# Patient Record
Sex: FEMALE | Race: WHITE | Employment: OTHER | URBAN - METROPOLITAN AREA
[De-identification: names, ages, dates, MRNs, and addresses within clinical notes are randomized per-mention and may not be internally consistent; named-entity substitution may affect disease eponyms.]

---

## 2017-05-04 ENCOUNTER — HOSPITAL ENCOUNTER (EMERGENCY)
Age: 37
Discharge: HOME | End: 2017-05-04
Attending: EMERGENCY MEDICINE
Payer: OTHER GOVERNMENT

## 2017-05-04 VITALS
SYSTOLIC BLOOD PRESSURE: 119 MMHG | BODY MASS INDEX: 24.71 KG/M2 | WEIGHT: 163 LBS | OXYGEN SATURATION: 98 % | RESPIRATION RATE: 18 BRPM | TEMPERATURE: 98.1 F | HEART RATE: 63 BPM | DIASTOLIC BLOOD PRESSURE: 66 MMHG | HEIGHT: 68 IN

## 2017-05-04 DIAGNOSIS — T78.40XA ALLERGIC REACTION, INITIAL ENCOUNTER: Primary | ICD-10-CM

## 2017-05-04 PROCEDURE — 74011250637 HC RX REV CODE- 250/637

## 2017-05-04 PROCEDURE — 74011636637 HC RX REV CODE- 636/637: Performed by: EMERGENCY MEDICINE

## 2017-05-04 PROCEDURE — 99283 EMERGENCY DEPT VISIT LOW MDM: CPT

## 2017-05-04 PROCEDURE — 74011250637 HC RX REV CODE- 250/637: Performed by: EMERGENCY MEDICINE

## 2017-05-04 RX ORDER — PREDNISONE 20 MG/1
40 TABLET ORAL DAILY
Qty: 10 TAB | Refills: 0 | Status: SHIPPED | OUTPATIENT
Start: 2017-05-04 | End: 2017-05-09

## 2017-05-04 RX ORDER — DIPHENHYDRAMINE HCL 25 MG
50 CAPSULE ORAL
Status: COMPLETED | OUTPATIENT
Start: 2017-05-04 | End: 2017-05-04

## 2017-05-04 RX ORDER — PREDNISONE 20 MG/1
40 TABLET ORAL
Status: COMPLETED | OUTPATIENT
Start: 2017-05-04 | End: 2017-05-04

## 2017-05-04 RX ORDER — FAMOTIDINE 20 MG/1
40 TABLET, FILM COATED ORAL
Status: COMPLETED | OUTPATIENT
Start: 2017-05-04 | End: 2017-05-04

## 2017-05-04 RX ORDER — DIPHENHYDRAMINE HCL 25 MG
50 CAPSULE ORAL EVERY 6 HOURS
Qty: 30 CAP | Refills: 0 | Status: SHIPPED | OUTPATIENT
Start: 2017-05-04 | End: 2017-05-07

## 2017-05-04 RX ORDER — DIPHENHYDRAMINE HCL 50 MG
50 CAPSULE ORAL
Qty: 30 CAP | Refills: 0 | Status: SHIPPED | OUTPATIENT
Start: 2017-05-04 | End: 2017-05-04

## 2017-05-04 RX ORDER — FAMOTIDINE 40 MG/1
40 TABLET, FILM COATED ORAL 2 TIMES DAILY
Qty: 30 TAB | Refills: 0 | Status: SHIPPED | OUTPATIENT
Start: 2017-05-04 | End: 2017-05-07

## 2017-05-04 RX ADMIN — FAMOTIDINE 40 MG: 20 TABLET, FILM COATED ORAL at 03:05

## 2017-05-04 RX ADMIN — PREDNISONE 40 MG: 20 TABLET ORAL at 03:04

## 2017-05-04 RX ADMIN — Medication 30 ML: at 03:06

## 2017-05-04 RX ADMIN — DIPHENHYDRAMINE HYDROCHLORIDE 50 MG: 25 CAPSULE ORAL at 03:04

## 2017-05-04 NOTE — ED PROVIDER NOTES
HPI Comments: 2:40 AM   Anastasia Beck is a 31 y.o. female presenting to the ED C/O allergic reaction onset 2 hours ago. Pt reports that she awoke with itching and pain in the mouth along with lip swelling. Pt reports some SOB. Pt notes that she ate a scallop for the first time the night before. Pt denies tobacco  And illicit drug Pt denies  any other Sx or complaints. Patient is a 31 y.o. female presenting with allergic reaction. The history is provided by the patient. No  was used. Allergic Reaction    This is a new problem. The current episode started 1 to 2 hours ago (2 hours ago). Associated symptoms include shortness of breath. There were no other medications available. Written by Andrae Officer, JALEN Eason, as dictated by Jonas Zepeda. Giovany Ball MD    History reviewed. No pertinent past medical history. Past Surgical History:   Procedure Laterality Date    HX APPENDECTOMY      HX  SECTION           History reviewed. No pertinent family history. Social History     Social History    Marital status:      Spouse name: N/A    Number of children: N/A    Years of education: N/A     Occupational History    Not on file. Social History Main Topics    Smoking status: Never Smoker    Smokeless tobacco: Not on file    Alcohol use Yes      Comment: occassionally    Drug use: No    Sexual activity: Not on file     Other Topics Concern    Not on file     Social History Narrative    No narrative on file         ALLERGIES: Review of patient's allergies indicates no known allergies. Review of Systems   HENT: Positive for facial swelling (Lip swelling). (+) Mouth pain   Respiratory: Positive for shortness of breath. All other systems reviewed and are negative.       Vitals:    17 0214   BP: 124/62   Pulse: 60   Resp: 18   Temp: 98.1 °F (36.7 °C)   SpO2: 100%   Weight: 73.9 kg (163 lb)   Height: 5' 8\" (1.727 m)            Physical Exam Constitutional: She is oriented to person, place, and time. She appears well-developed and well-nourished. No distress. HENT:   Head: Normocephalic and atraumatic. Right Ear: External ear normal.   Left Ear: External ear normal.   Redness to tongue and to posterior pharynx but no significant inflammation. Mild inflammation to upper lip   Eyes: Conjunctivae and EOM are normal. Pupils are equal, round, and reactive to light. No scleral icterus. No pallor   Neck: Normal range of motion. Neck supple. No JVD present. No tracheal deviation present. No thyromegaly present. Cardiovascular: Normal rate, regular rhythm and normal heart sounds. Pulmonary/Chest: Effort normal and breath sounds normal. No stridor. No respiratory distress. Abdominal: Soft. Bowel sounds are normal. She exhibits no distension. There is no tenderness. There is no rebound and no guarding. Musculoskeletal: Normal range of motion. She exhibits no edema or tenderness. No soft tissue injuries   Lymphadenopathy:     She has no cervical adenopathy. Neurological: She is alert and oriented to person, place, and time. She has normal reflexes. No cranial nerve deficit. Coordination normal.   Skin: Skin is warm and dry. No rash noted. She is not diaphoretic. No erythema. Psychiatric: She has a normal mood and affect. Her behavior is normal. Judgment and thought content normal.   Nursing note and vitals reviewed. RESULTS:      PULSE OXIMETRY NOTE:  Pulse-ox is 100% on RA  Interpretation: Normal       No orders to display        Labs Reviewed - No data to display    No results found for this or any previous visit (from the past 12 hour(s)). MDM  Number of Diagnoses or Management Options  Diagnosis management comments: DDx: Mild inflammation of oropharynx. No significant findings of airway edema. Will treat symptoms and discharge.      ED Course     Medications   GI COCKTAIL [facility] CMPD) (30 mL Oral Given 5/4/17 0304)   diphenhydrAMINE (BENADRYL) capsule 50 mg (50 mg Oral Given 5/4/17 0304)   famotidine (PEPCID) tablet 40 mg (40 mg Oral Given 5/4/17 0305)   predniSONE (DELTASONE) tablet 40 mg (40 mg Oral Given 5/4/17 0304)        Procedures  PROGRESS NOTE:  2:40 AM  Initial assessment performed. Written by Saniya Sawyer ED Scribe, as dictated by Carmel Acosta. Jessica Johnson MD     DISCHARGE NOTE:  3:51 AM   Travis Choe  results have been reviewed with her. She has been counseled regarding her diagnosis, treatment, and plan. She verbally conveys understanding and agreement of the signs, symptoms, diagnosis, treatment and prognosis and additionally agrees to follow up as discussed. She also agrees with the care-plan and conveys that all of her questions have been answered. I have also provided discharge instructions for her that include: educational information regarding their diagnosis and treatment, and list of reasons why they would want to return to the ED prior to their follow-up appointment, should her condition change. The patient and/or family has been provided with education for proper Emergency Department utilization. CLINICAL IMPRESSION:    1. Allergic reaction, initial encounter        PLAN: DISCHARGE HOME    Follow-up Information     Follow up With Details Comments Contact Info    SARAH Soto Schedule an appointment as soon as possible for a visit in 2 days Follow up with your primary care physician [facility]      [facility] EMERGENCY DEPT Go to As needed, If symptoms worsen [address] [place] [address]  [phone]          Current Discharge Medication List      START taking these medications    Details   predniSONE (DELTASONE) 20 mg tablet Take 2 Tabs by mouth daily for 5 days. Qty: 10 Tab, Refills: 0      famotidine (PEPCID) 40 mg tablet Take 1 Tab by mouth two (2) times a day for 3 days.  Then as needed  Qty: 30 Tab, Refills: 0      !! diphenhydrAMINE (BENADRYL) 50 mg capsule Take 1 Cap by mouth now for 1 dose. Qty: 30 Cap, Refills: 0      !! diphenhydrAMINE (BENADRYL) 25 mg capsule Take 2 Caps by mouth every six (6) hours for 3 days. Then as needed. Qty: 30 Cap, Refills: 0       !! - Potential duplicate medications found. Please discuss with provider. ATTESTATIONS:  This note is prepared by Sandra Mota, acting as Scribe for MarthaTrtejas. Jose Cotter MD .    [employer]. Jose Cotter MD : The scribe's documentation has been prepared under my direction and personally reviewed by me in its entirety. I confirm that the note above accurately reflects all work, treatment, procedures, and medical decision making performed by me.

## 2017-05-04 NOTE — ED NOTES
Presented to ED to be evaluated for reported possible allergic reaction to suspected scallops in which she ate on last p.m. Patient reports awaken this a.m with noted swelling, itching, and pain to mouth. Patient also reports SOB at time of assessment. Patient is noted to be able to speak in complete sentences upon assessment with no s/s distress noted.

## 2017-05-04 NOTE — DISCHARGE INSTRUCTIONS
Allergic Reaction: Care Instructions  Your Care Instructions  An allergic reaction is an excessive response from your immune system to a medicine, chemical, food, insect bite, or other substance. A reaction can range from mild to life-threatening. Some people have a mild rash, hives, and itching or stomach cramps. In severe reactions, swelling of your tongue and throat can close up your airway so that you cannot breathe. Follow-up care is a key part of your treatment and safety. Be sure to make and go to all appointments, and call your doctor if you are having problems. It's also a good idea to know your test results and keep a list of the medicines you take. How can you care for yourself at home? · If you know what caused your allergic reaction, be sure to avoid it. Your allergy may become more severe each time you have a reaction. · Take an over-the-counter antihistamine, such as cetirizine (Zyrtec) or loratadine (Claritin), to treat mild symptoms. Read and follow directions on the label. Some antihistamines can make you feel sleepy. Do not give antihistamines to a child unless you have checked with your doctor first. Mild symptoms include sneezing or an itchy or runny nose; an itchy mouth; a few hives or mild itching; and mild nausea or stomach discomfort. · Do not scratch hives or a rash. Put a cold, moist towel on them or take cool baths to relieve itching. Put ice packs on hives, swelling, or insect stings for 10 to 15 minutes at a time. Put a thin cloth between the ice pack and your skin. Do not take hot baths or showers. They will make the itching worse. · Your doctor may prescribe a shot of epinephrine to carry with you in case you have a severe reaction. Learn how to give yourself the shot and keep it with you at all times. Make sure it is not . · Go to the emergency room every time you have a severe reaction, even if you have used your shot of epinephrine and are feeling better. Symptoms can come back after a shot. · Wear medical alert jewelry that lists your allergies. You can buy this at most drugstores. · If your child has a severe allergy, make sure that his or her teachers, babysitters, coaches, and other caregivers know about the allergy. They should have an epinephrine shot, know how and when to give it, and have a plan to take your child to the hospital.  When should you call for help? Give an epinephrine shot if:  · You think you are having a severe allergic reaction. · You have symptoms in more than one body area, such as mild nausea and an itchy mouth. After giving an epinephrine shot call 911, even if you feel better. Call 911 if:  · You have symptoms of a severe allergic reaction. These may include:  ¨ Sudden raised, red areas (hives) all over your body. ¨ Swelling of the throat, mouth, lips, or tongue. ¨ Trouble breathing. ¨ Passing out (losing consciousness). Or you may feel very lightheaded or suddenly feel weak, confused, or restless. · You have been given an epinephrine shot, even if you feel better. Call your doctor now or seek immediate medical care if:  · You have symptoms of an allergic reaction, such as:  ¨ A rash or hives (raised, red areas on the skin). ¨ Itching. ¨ Swelling. ¨ Belly pain, nausea, or vomiting. Watch closely for changes in your health, and be sure to contact your doctor if:  · You do not get better as expected. Where can you learn more? Go to http://ania-meli.info/. Enter V006 in the search box to learn more about \"Allergic Reaction: Care Instructions. \"  Current as of: February 12, 2016  Content Version: 11.2  © 7070-0979 [employer]. Care instructions adapted under license by [employer] (which disclaims liability or warranty for this information).  If you have questions about a medical condition or this instruction, always ask your healthcare professional. Yisel Tre[employer] disclaims any warranty or liability for your use of this information.

## 2017-05-04 NOTE — ED TRIAGE NOTES
Patient states she woke up with mouth pain, itching inside of mouth and lip swelling. Patient states she ate 1 scallop tonight, no known allergies or reactions in past.    Sepsis Screening completed    (  )Patient meets SIRS criteria. ( x )Patient does not meet SIRS criteria.       SIRS Criteria is achieved when two or more of the following are present   Temperature < 96.8°F (36°C) or > 100.9°F (38.3°C)   Heart Rate > 90 beats per minute   Respiratory Rate > 20 breaths per minute   WBC count > 12,000 or <4,000 or > 10% bands

## 2018-12-05 ENCOUNTER — HOSPITAL ENCOUNTER (EMERGENCY)
Age: 38
Discharge: HOME | End: 2018-12-05
Attending: EMERGENCY MEDICINE
Payer: OTHER GOVERNMENT

## 2018-12-05 VITALS
HEIGHT: 68 IN | OXYGEN SATURATION: 100 % | HEART RATE: 84 BPM | WEIGHT: 170 LBS | TEMPERATURE: 97.9 F | DIASTOLIC BLOOD PRESSURE: 64 MMHG | SYSTOLIC BLOOD PRESSURE: 133 MMHG | BODY MASS INDEX: 25.76 KG/M2 | RESPIRATION RATE: 16 BRPM

## 2018-12-05 DIAGNOSIS — G43.019 INTRACTABLE MIGRAINE WITHOUT AURA AND WITHOUT STATUS MIGRAINOSUS: Primary | ICD-10-CM

## 2018-12-05 PROCEDURE — 99283 EMERGENCY DEPT VISIT LOW MDM: CPT

## 2018-12-05 PROCEDURE — 96361 HYDRATE IV INFUSION ADD-ON: CPT

## 2018-12-05 PROCEDURE — 96375 TX/PRO/DX INJ NEW DRUG ADDON: CPT

## 2018-12-05 PROCEDURE — 99282 EMERGENCY DEPT VISIT SF MDM: CPT

## 2018-12-05 PROCEDURE — 96374 THER/PROPH/DIAG INJ IV PUSH: CPT

## 2018-12-05 PROCEDURE — 74011250636 HC RX REV CODE- 250/636: Performed by: EMERGENCY MEDICINE

## 2018-12-05 RX ORDER — ONDANSETRON 2 MG/ML
4 INJECTION INTRAMUSCULAR; INTRAVENOUS
Status: COMPLETED | OUTPATIENT
Start: 2018-12-05 | End: 2018-12-05

## 2018-12-05 RX ORDER — BUTALBITAL, ACETAMINOPHEN AND CAFFEINE 300; 40; 50 MG/1; MG/1; MG/1
1 CAPSULE ORAL
Qty: 12 CAP | Refills: 0 | Status: SHIPPED | OUTPATIENT
Start: 2018-12-05

## 2018-12-05 RX ORDER — RANITIDINE 150 MG/1
150 TABLET, FILM COATED ORAL 2 TIMES DAILY
COMMUNITY

## 2018-12-05 RX ORDER — KETOROLAC TROMETHAMINE 30 MG/ML
30 INJECTION, SOLUTION INTRAMUSCULAR; INTRAVENOUS
Status: COMPLETED | OUTPATIENT
Start: 2018-12-05 | End: 2018-12-05

## 2018-12-05 RX ORDER — DIPHENHYDRAMINE HYDROCHLORIDE 50 MG/ML
25 INJECTION, SOLUTION INTRAMUSCULAR; INTRAVENOUS ONCE
Status: COMPLETED | OUTPATIENT
Start: 2018-12-05 | End: 2018-12-05

## 2018-12-05 RX ORDER — ONDANSETRON 4 MG/1
4 TABLET, ORALLY DISINTEGRATING ORAL
Qty: 6 TAB | Refills: 0 | Status: SHIPPED | OUTPATIENT
Start: 2018-12-05

## 2018-12-05 RX ADMIN — SODIUM CHLORIDE 1000 ML: 900 INJECTION, SOLUTION INTRAVENOUS at 07:41

## 2018-12-05 RX ADMIN — DIPHENHYDRAMINE HYDROCHLORIDE 25 MG: 50 INJECTION, SOLUTION INTRAMUSCULAR; INTRAVENOUS at 07:40

## 2018-12-05 RX ADMIN — ONDANSETRON 4 MG: 2 INJECTION INTRAMUSCULAR; INTRAVENOUS at 07:40

## 2018-12-05 RX ADMIN — KETOROLAC TROMETHAMINE 30 MG: 30 INJECTION, SOLUTION INTRAMUSCULAR at 07:41

## 2018-12-05 NOTE — ED PROVIDER NOTES
EMERGENCY DEPARTMENT HISTORY AND PHYSICAL EXAM 
 
Date: 2018 Patient Name: Linda Richardson History of Presenting Illness Chief Complaint Patient presents with  Migraine History Provided By: Patient Chief Complaint: HA 
Duration: 3 Days Timing:  Constant Location: behind right eye to \"middle of my head area. \" Quality: Aching Severity: 10 out of 10 Modifying Factors: Excedrin did not provide relief. Associated Symptoms: Right eye pain and photophobia Additional History (Context):  
7:19 AM 
Linda Richardson is a 41 y.o. female with PMHX of migraines (years ago) and IBS who presents to the emergency department C/O 10/10 HA from right eye to \"middle of my head area\" onset 3 days ago. Associated sxs include right eye pain and photophobia. Pt states she normally takes Excedrin for migraines and sees Dr. Trinidad Comes for it. NKDA. PSHx of . Pt came in because her headaches normally do not last this long and Excedrin has not helped it. Pt denies chance of pregnancy, MRI hx, and any other sxs or complaints. PCP: SARAH Faye Current Outpatient Medications Medication Sig Dispense Refill  raNITIdine (ZANTAC) 150 mg tablet Take 150 mg by mouth two (2) times a day.  B.infantis-B.ani-B.long-B.bifi (PROBIOTIC 4X) 10-15 mg TbEC Take  by mouth.  dicyclomine HCl (DICYCLOMINE PO) Take  by mouth.  aspirin-acetaminophen-caffeine (EXCEDRIN ES) 250-250-65 mg per tablet Take 1 Tab by mouth.  butalbital-acetaminophen-caff (FIORICET) -40 mg per capsule Take 1 Cap by mouth every six (6) hours as needed for Pain. 12 Cap 0  
 ondansetron (ZOFRAN ODT) 4 mg disintegrating tablet Take 1 Tab by mouth every eight (8) hours as needed for Nausea. 6 Tab 0 Past History Past Medical History: 
Past Medical History:  
Diagnosis Date  IBS (irritable bowel syndrome)  Migraines Past Surgical History: 
Past Surgical History: Procedure Laterality Date  HX  SECTION    
 HX HEENT    
 tonsils & wisdom teeth Family History: 
History reviewed. No pertinent family history. Social History: 
Social History Tobacco Use  Smoking status: Never Smoker  Smokeless tobacco: Never Used Substance Use Topics  Alcohol use: Yes Comment: occassionally  Drug use: No  
 
 
Allergies: 
No Known Allergies Review of Systems Review of Systems Eyes: Positive for photophobia and pain. Neurological: Positive for headaches. All other systems reviewed and are negative. Physical Exam  
 
Vitals:  
 18 0645 BP: 133/64 Pulse: 84 Resp: 16 Temp: 97.9 °F (36.6 °C) SpO2: 100% Weight: 77.1 kg (170 lb) Height: 5' 8\" (1.727 m) Physical Exam  
Constitutional: She is oriented to person, place, and time. She appears well-developed and well-nourished. Appears in pain. HENT:  
Head: Normocephalic and atraumatic. Eyes: Pupils are equal, round, and reactive to light. Neck: Neck supple. Cardiovascular: Normal rate, regular rhythm, S1 normal, S2 normal and normal heart sounds. Pulmonary/Chest: Breath sounds normal. No respiratory distress. She has no wheezes. She has no rales. She exhibits no tenderness. Abdominal: Soft. She exhibits no distension and no mass. There is no tenderness. There is no guarding. Musculoskeletal: Normal range of motion. She exhibits no edema or tenderness. Neurological: She is alert and oriented to person, place, and time. No cranial nerve deficit. Skin: No rash noted. Psychiatric: She has a normal mood and affect. Her behavior is normal. Thought content normal.  
Nursing note and vitals reviewed. Diagnostic Study Results Labs - No results found for this or any previous visit (from the past 12 hour(s)). Radiologic Studies - No orders to display CT Results  (Last 48 hours) None CXR Results  (Last 48 hours) None Medications given in the ED- Medications  
sodium chloride 0.9 % bolus infusion 1,000 mL (0 mL IntraVENous IV Completed 12/5/18 0813) ondansetron [facility]) injection 4 mg (4 mg IntraVENous Given 12/5/18 0740)  
ketorolac (TORADOL) injection 30 mg (30 mg IntraVENous Given 12/5/18 0741) diphenhydrAMINE (BENADRYL) injection 25 mg (25 mg IntraVENous Given 12/5/18 0740) Medical Decision Making I am the first provider for this patient. I reviewed the vital signs, available nursing notes, past medical history, past surgical history, family history and social history. Vital Signs-Reviewed the patient's vital signs. Pulse Oximetry Analysis - 100% on room air. Records Reviewed: Nursing Notes and Old Medical Records Provider Notes (Medical Decision Making):  
 
Procedures: 
Procedures ED Course:  
7:19 AM Initial assessment performed. The patients presenting problems have been discussed, and they are in agreement with the care plan formulated and outlined with them. I have encouraged them to ask questions as they arise throughout their visit. 8:15 AM Pt HA is improved and pt is ready for d/c. Diagnosis and Disposition DISCHARGE NOTE: 
8:15 AM 
Laina Lucas  results have been reviewed with her. She has been counseled regarding her diagnosis, treatment, and plan. She verbally conveys understanding and agreement of the signs, symptoms, diagnosis, treatment and prognosis and additionally agrees to follow up as discussed. She also agrees with the care-plan and conveys that all of her questions have been answered. I have also provided discharge instructions for her that include: educational information regarding their diagnosis and treatment, and list of reasons why they would want to return to the ED prior to their follow-up appointment, should her condition change. She has been provided with education for proper emergency department utilization.   
 
CLINICAL IMPRESSION: 
 
 1. Intractable migraine without aura and without status migrainosus PLAN: 
1. D/C Home 2. Current Discharge Medication List  
  
START taking these medications Details  
butalbital-acetaminophen-caff (FIORICET) -40 mg per capsule Take 1 Cap by mouth every six (6) hours as needed for Pain. Qty: 12 Cap, Refills: 0  
  
ondansetron (ZOFRAN ODT) 4 mg disintegrating tablet Take 1 Tab by mouth every eight (8) hours as needed for Nausea. Qty: 6 Tab, Refills: 0  
  
  
 
3. Follow-up Information Follow up With Specialties Details Why Contact Info Tc Moncada, PA Physician Assistant Schedule an appointment as soon as possible for a visit in 2 days For Primary Care Follow Up [address] [facility] [address] [phone] [facility] EMERGENCY DEPT Emergency Medicine Go to If symptoms worsen, As needed [address] [facility] [address] 
[phone]  
  
 
_______________________________ Attestations: This note is prepared by Master Hooker, acting as Scribe for Joshua Villegas MD. Joshua Villegas MD:  The scribe's documentation has been prepared under my direction and personally reviewed by me in its entirety. I confirm that the note above accurately reflects all work, treatment, procedures, and medical decision making performed by me. 
_______________________________

## 2021-04-02 ENCOUNTER — HOSPITAL ENCOUNTER (OUTPATIENT)
Dept: PHYSICAL THERAPY | Age: 41
Discharge: HOME | End: 2021-04-02
Payer: OTHER GOVERNMENT

## 2021-04-02 PROCEDURE — 97161 PT EVAL LOW COMPLEX 20 MIN: CPT

## 2021-04-02 PROCEDURE — 97110 THERAPEUTIC EXERCISES: CPT

## 2021-04-02 NOTE — PROGRESS NOTES
[facility] at [address] [place] [address] Drive: [phone]   Fax: [phone]  PLAN OF CARE / 5 [facility] FOR PHYSICAL THER[facility]  Patient Name: Jonathan Sims : 1980   Medical   Diagnosis: Right ankle pain [M25.571] Treatment Diagnosis: Right ankle pain   Onset Date: 3/14/2021     Referral Source: [facility], Not On File, MD Start of Care [facility]): 2021   Prior Hospitalization: See medical history Provider #: [id number]   Prior Level of Function: intense exerciser one hour five days per week   Comorbidities: right ankle sprain previously x3. OA knees and hips and shoulders, protein S deficiency   Medications: Verified on Patient Summary List   The Plan of Care and following information is based on the information from the initial evaluation.   ===========================================================================================  Assessment / brock information:  Jonathan Sims is a 47 y.o.  female who presents s/p acute, traumatic Grade III right lateral ankle sprain with deficits in right ankle ROM and strength, significant deficits in right ankle proprioception/balance, mildly abnormal gait, mild edema and pain. Patient will continue to benefit from skilled PT services to modify and progress therapeutic interventions, address functional mobility deficits, address ROM deficits, address strength deficits, analyze and address soft tissue restrictions, analyze and cue movement patterns, analyze and modify body mechanics/ergonomics, assess and modify postural abnormalities and address imbalance/dizziness to attain remaining goals.     Pt instructed in [facility] and will f/u in clinic for PT.  ===========================================================================================  Eval Complexity: History MEDIUM  Complexity : 1-2 comorbidities / personal factors will impact the outcome/ POC ;  Examination  LOW Complexity : 1-2 Standardized tests and measures addressing body structure, function, activity limitation and / or participation in recreation ; Presentation LOW Complexity : Stable, uncomplicated ;  Decision Making MEDIUM Complexity : FOTO score of 26-74; : FOTO score = an established functional score where 100 = no disability  Overall Complexity LOW   Problem List: pain affecting function, decrease ROM, decrease strength, edema affecting function, impaired gait/ balance, decrease ADL/ functional abilitiies, decrease activity tolerance and decrease flexibility/ joint mobility   Treatment Plan may include any combination of the following: Therapeutic exercise, Therapeutic activities, Neuromuscular re-education, Physical agent/modality, Gait/balance training, Manual therapy, Aquatic therapy, Patient education, Self Care training, Functional mobility training, Home safety training and Stair training  Patient / Family readiness to learn indicated by: asking questions, trying to perform skills and interest  Persons(s) to be included in education: patient (P)  Barriers to Learning/Limitations: no  Measures Taken: NA  Patient Goal (s): \"I want to get back to my full exercise program without pain. \"   Patient self reported health status: good  Rehabilitation Potential: good  Goals:  Short Term Goals: To be accomplished in 4 weeks:   Patient will report compliance with HEP at least 1x/day to aid in rehabilitation program.   Status at IE: Patient instructed in and provided written copy of initial Home Exercise Program.   Current: Same as IE     Patient will demonstrate right ankle DF AROM of 17 degrees to aid in completion of ADLs. Status at IE: right ankle DF 9 degrees. Current: Same as IE       Long Term Goals: To be accomplished in 8 weeks:   Patient will increase strength to 5/5 throughout B LEs to aid in return recreational activities and ADLs. Status at IE: right ankle DF, Inv, Ev 4/5.    Current: Same as IE     Patient will report pain less than 1-2/10 average to aid in completion of ADLs. Status at IE: 1-8/10   Current: Same as IE     Patient will be able to SLS eyes closed 30 seconds to demonstrate improved safety in dynamic functional activities. Status at IE: SLS eyes closed left 10, right 3 seconds   Current: Same as IE     Patient will improve FOTO (an established functional score where 100 = no disability) to 74 points overall to demonstrate improvement in functional ability. Status at IE:49   Current: Same as IE       Frequency / Duration:   Patient to be seen 2  times per week for 8  weeks:  Patient / Caregiver education and instruction: self care and exercises      . Therapist Signature: Geno Singletary DPT Date: 4/3/5105   Certification Period: NA Time: 11:07 AM   ===========================================================================================  I certify that the above Physical Therapy Services are being furnished while the patient is under my care. I agree with the treatment plan and certify that this therapy is necessary. Physician Signature:        Date:       Time:        Dilcia Tabares MD    Please sign and return to [facility] or you may fax the signed copy to [phone]. Thank you.

## 2021-04-02 NOTE — PROGRESS NOTES
PT DAILY TREATMENT NOTE/FOOT AND ANKLE EVAL 10-18    Patient Name: Lauren Martinez  Date:2021  : 1980  [x]  Patient  Verified  Payor:  / Plan: Christopher Hunter 76 / Product Type:  /    In time:937  Out time:1027  Total Treatment Time (min): 25  Visit #: 1 of 16  Treatment Area: Right ankle pain [M25.571]    SUBJECTIVE  Pain Level (0-10 scale): 1 (1-8)  []constant []intermittent [x]improving []worsening []no change since onset    Any medication changes, allergies to medications, adverse drug reactions, diagnosis change, or new procedure performed?: [x] No    [] Yes (see summary sheet for update)  Subjective functional status/changes:     Patient has CC of right ankle pain since fall down stairs 3/14/21sustaining Grade III right lateral ankle pain. Patient describes pain as strong ache anterior and posterolateral right ankle. Pain is worse in PM. Denies numbness/tingling. Denies popping/clicking. Aggravating factors: walking. Alleviating factors: rest and elevation. Denies red flags: SOB, chest pain, dizziness/lightheadedness, blurred/double vision, HA, chills/fevers, night sweats, change in bowel/bladder control, abdominal pain, difficulty swallowing, slurred speech, unexplained weight gain/loss, nausea, vomiting. PMHx: right ankle sprain previously x3. OA knees and hips and shoulders, protein S deficiency. Surgical Hx: none. Social Hx:  three children, two story home, no alcohol, no tobacco, sedentary full time work. PLOF: intense exerciser one hour five days per week. CLOF: unable to run, or participate in daily boot camp. Diagnostic Imaging: knee and hip bone spurs.     OBJECTIVE/EXAMINATION    25 min [x]Eval                  []Re-Eval       25 min Therapeutic Exercise:  [x] See flow sheet :   Rationale: increase ROM, increase strength and decrease pain to improve the patients ability to complete ADLs          With   [] TE   [] TA   [] neuro   [] other: Patient Education: [x] Review HEP    [] Progressed/Changed HEP based on:   [] positioning   [] body mechanics   [] transfers   [] heat/ice application    [] other:        Physical Therapy Evaluation  - Foot and Ankle    Gait: [] Normal    [] Abnormal    [x] Antalgic    [] NWB    Device:  Describe: Slight decreased stance time right LE with early toe off. ROM/Strength  [] Unable to assess at this time      AROM   Strength (1-5)   Left Right Left  Right   Dorsiflexion 17 9 5 4   Plantarflexion 57 57 5 5   Inversion 61 44 5 4   Eversion 20 12 5 4     Flexibility: [] Unable to assess at this time  Gastroc:    (L) Tightness [] WNL   [x] Min   [] Mod   [] Severe    (R) Tightness [] WNL   [x] Min   [] Mod   [] Severe  Soleus:    (L) Tightness [] WNL   [x] Min   [] Mod   [] Severe    (R) Tightness [] WNL   [x] Min   [] Mod   [] Severe  Other:      (L) Tightness [] WNL   [] Min   [] Mod   [] Severe    (R) Tightness [] WNL   [] Min   [] Mod   [] Severe    Palpation:   Location:  Patient's Pain Response: [] Min   [x] Mod   [] Severe  Location: right ATF ligament  Patient's Pain Response: [] Min   [] Mod   [] Severe    Optional Tests:   SLS eyes open: left 60 seconds, right 55    SLS eyes closed: left 10 seconds, right 3    Sub-talar alignment: [x] Neutral     [] Pronation      [] Supination    Forefoot alignment:  [] Neutral     [] Varus            [x] Valgus    Swelling:   Left (cm) Right (cm)   Figure 8: 53.0 53.0   Observable, but not measurable mild edema right anterolateral ankle.       Anterior Drawer: [] Neg    [x] Pos  Posterior Drawer:  [x] Neg    [] Pos  Inversion Stress:  [] Neg    [x] Pos  Talar Tilt:   [] Neg    [x] Pos  Eversion Stress:  [x] Neg    [] Pos  Olya's Sign:  [x] Neg    [] Pos  Adair Test: [x] Neg    [] Pos    Other tests/ comments:       Pain Level (0-10 scale) post treatment: 0    ASSESSMENT/Changes in Function: Patient presents s/p acute, traumatic Grade III right lateral ankle sprain with deficits in right ankle ROM and strength, significant deficits in right ankle proprioception/balance, mildly abnormal gait, mild edema and pain. Patient will continue to benefit from skilled PT services to modify and progress therapeutic interventions, address functional mobility deficits, address ROM deficits, address strength deficits, analyze and address soft tissue restrictions, analyze and cue movement patterns, analyze and modify body mechanics/ergonomics, assess and modify postural abnormalities and address imbalance/dizziness to attain remaining goals.      [x]  See Plan of Care  []  See progress note/recertification  []  See Discharge Summary         Progress towards goals / Updated goals:  See POC    PLAN  []  Upgrade activities as tolerated     [x]  Continue plan of care  []  Update interventions per flow sheet       []  Discharge due to:_  []  Other:_      Donna Shepard PT 4/2/2021  9:34 AM

## 2021-04-15 ENCOUNTER — HOSPITAL ENCOUNTER (OUTPATIENT)
Dept: PHYSICAL THERAPY | Age: 41
Discharge: HOME | End: 2021-04-15
Payer: OTHER GOVERNMENT

## 2021-04-15 PROCEDURE — 97110 THERAPEUTIC EXERCISES: CPT

## 2021-04-15 PROCEDURE — 97530 THERAPEUTIC ACTIVITIES: CPT

## 2021-04-15 PROCEDURE — 97112 NEUROMUSCULAR REEDUCATION: CPT

## 2021-04-15 NOTE — PROGRESS NOTES
PT DAILY TREATMENT NOTE    Patient Name: Rosa Isela Aranda  Date:4/15/2021  : 1980  [x]  Patient  Verified  Payor:  / Plan: Christopher Hunter 76 / Product Type:  /    In time: 445  Out time: 7654  Total Treatment Time (min): 64  Total Timed Codes (min): 64  1:1 Treatment Time ( only): 64   Visit #: 1  15    Treatment Area: Right ankle pain [M25.571]    SUBJECTIVE  Pain Level (0-10 scale): 0-1  Any medication changes, allergies to medications, adverse drug reactions, diagnosis change, or new procedure performed?: [x] No    [] Yes (see summary sheet for update)  Subjective functional status/changes:   [] No changes reported  \"The ankle did fairly well while I was traveling. I did a lot of walking and I got just a bit of swelling. I am being careful not to do the things that cause sharp pain. \"    OBJECTIVE    34 min Therapeutic Exercise:  [x] See flow sheet :   Rationale: increase ROM, increase strength and decrease pain to improve the patients ability to complete ADLs  ambulation safety and efficiency in order to improve patient's ability to safely ambulate at home for self care.         15 min Therapeutic Activity:  [x]  See flow sheet :   Rationale: increase ROM, increase strength and improve coordination  to improve the patients ability to Complete ADLS     15 min Neuromuscular Re-education:  [x]  See flow sheet :   Rationale: improve coordination, improve balance and increase proprioception  to improve the patients ability to complete ADLS, and decrease falls risk    With   [] TE   [] TA   [] neuro   [] other: Patient Education: [x] Review HEP    [] Progressed/Changed HEP based on:   [] positioning   [] body mechanics   [] transfers   [] heat/ice application    [] other:      Other Objective/Functional Measures: NA     Pain Level (0-10 scale) post treatment: 0    ASSESSMENT/Changes in Function: Patient educated in additional exercises to incorporate into HEP and provided green resistance band for new exercises. Demonstrates deficits in right ankle motor control but responds well to cueing. Patient responds well to treatment session. No adverse effects were noted from today's treatment session. Patient will continue to benefit from skilled PT services to modify and progress therapeutic interventions, address functional mobility deficits, address ROM deficits, address strength deficits, analyze and address soft tissue restrictions, analyze and cue movement patterns, analyze and modify body mechanics/ergonomics, assess and modify postural abnormalities,  and instruct in home and community integration to attain remaining goals. []  See Plan of Care  []  See progress note/recertification  []  See Discharge Summary         Progress towards goals / Updated goals:  Short Term Goals: To be accomplished in 4 weeks:                   Patient will report compliance with HEP at least 1x/day to aid in rehabilitation program.                   Status at IE: Patient instructed in and provided written copy of initial Home Exercise Program.                   Current: Patient reports not yet consistent with full HEP as she was traveling past week. Encouraging consistent compliance. 4/15/2021                      Patient will demonstrate right ankle DF AROM of 17 degrees to aid in completion of ADLs. Status at IE: right ankle DF 9 degrees. Current: Same as IE      Long Term Goals: To be accomplished in 8 weeks:                   Patient will increase strength to 5/5 throughout B LEs to aid in return recreational activities and ADLs. Status at IE: right ankle DF, Inv, Ev 4/5. Current: Same as IE                      Patient will report pain less than 1-2/10 average to aid in completion of ADLs.                    Status at IE: 1-8/10                   Current: Same as IE                      Patient will be able to SLS eyes closed 30 seconds to demonstrate improved safety in dynamic functional activities. Status at IE: SLS eyes closed left 10, right 3 seconds                   Current: Same as IE                      Patient will improve FOTO (an established functional score where 100 = no disability) to 74 points overall to demonstrate improvement in functional ability.                    Status at IE:49                   Current: Same as IE    PLAN  []  Upgrade activities as tolerated     [x]  Continue plan of care  []  Update interventions per flow sheet       []  Discharge due to:_  []  Other:_      Aliza Ball, PT, DPT 4/15/2021  9:42 AM    Future Appointments   Date Time Provider Kiana Fraga   4/19/2021 11:00 AM Moira Jones PT [facility] [place] [facility]   4/23/2021  9:30 AM Moira Jones PT [facility] [place] [facility]   4/28/2021  9:30 AM Maggadiel [place], PT [facility]   4/30/2021  9:30 AM Magdalen [place], PT [facility]   5/5/2021  9:30 AM Magdalen [place], PT [facility]   5/7/2021  9:30 AM Magdalen [place], PT [facility]

## 2021-04-19 ENCOUNTER — APPOINTMENT (OUTPATIENT)
Dept: PHYSICAL THERAPY | Age: 41
End: 2021-04-19
Payer: OTHER GOVERNMENT

## 2021-04-23 ENCOUNTER — HOSPITAL ENCOUNTER (OUTPATIENT)
Dept: PHYSICAL THERAPY | Age: 41
Discharge: HOME | End: 2021-04-23
Payer: OTHER GOVERNMENT

## 2021-04-23 PROCEDURE — 97530 THERAPEUTIC ACTIVITIES: CPT | Performed by: PHYSICAL THERAPIST

## 2021-04-23 PROCEDURE — 97110 THERAPEUTIC EXERCISES: CPT | Performed by: PHYSICAL THERAPIST

## 2021-04-23 PROCEDURE — 97112 NEUROMUSCULAR REEDUCATION: CPT | Performed by: PHYSICAL THERAPIST

## 2021-04-23 NOTE — PROGRESS NOTES
PT DAILY TREATMENT NOTE    Patient Name: Rosa Isela Aranda  Date:2021  : 1980  [x]  Patient  Verified  Payor:  / Plan: Christopher Hunter 74 / Product Type:  /    In HZRE:[id number]  Out time:1040  Total Treatment Time (min): 72  Total Timed Codes (min): 65    Visit #: 3 of 15    Treatment Area: Right ankle pain [M25.571]    SUBJECTIVE  Pain Level (0-10 scale): 0-1  Any medication changes, allergies to medications, adverse drug reactions, diagnosis change, or new procedure performed?: [x] No    [] Yes (see summary sheet for update)  Subjective functional status/changes:   [] No changes reported  Pt with new order for left knee     OBJECTIVE  35 min Therapeutic Exercise:  [x]? See flow sheet :   Rationale: increase ROM, increase strength and decrease pain to improve the patients ability to complete ADLs  ambulation safety and efficiency in order to improve patient's ability to safely ambulate at home for self care. 10 min Therapeutic Activity:  [x]? See flow sheet :   Rationale: increase ROM, increase strength and improve coordination  to improve the patients ability to Complete ADLS     20 min Neuromuscular Re-education:  [x]?   See flow sheet :   Rationale: improve coordination, improve balance and increase proprioception  to improve the patients ability to complete ADLS, and decrease falls risk          With   [] TE   [] TA   [] neuro   [] other: Patient Education: [x] Review HEP    [] Progressed/Changed HEP based on:   [] positioning   [] body mechanics   [] transfers   [] heat/ice application    [] other:      Other Objective/Functional Measures:   Left knee : 9 hyper to 143/146 flexion ,all ligaments good anterior /post drawer   Neg varus /valgus   Neg drop home, neg barney   Flexibility WNL HS , quad and hip flexor   + crepitus posterior inferior patella with + pain      Taught TMR ( total motion release ) concept to utilize smoother painfree side to promote less pain improved control and motion on opp side ,   Performed this concept with repeated mobility on easier side with  sitting leg raise , SLS toe reach , and Single leg sit to stand   In each case able to affect control and lessen pain on right side with repeated motions on left . Pain Level (0-10 scale) post treatment: 0    ASSESSMENT/Changes in Function: as noted above responded well to TMR concept with repeated mobility ex , pt issued handout . Started knee ex and assessed left knee good ROM , good flexibility , good stability , + crepitus post patella. Progressing well with ex , fatigue but safe and progressing control with balance ex right LE / ankle . No adverse affect with therapy     Patient will continue to benefit from skilled PT services to modify and progress therapeutic interventions, address functional mobility deficits, address ROM deficits, address strength deficits, analyze and address soft tissue restrictions, analyze and cue movement patterns, analyze and modify body mechanics/ergonomics, assess and modify postural abnormalities and address imbalance/dizziness to attain remaining goals. [x]  See Plan of Care  []  See progress note/recertification  []  See Discharge Summary         Progress towards goals / Updated goals:  Short Term Goals: To be accomplished in 4 weeks:                   SGQYCFO will report compliance with HEP at least 1x/day to aid in rehabilitation program.                   Status at IE: Patient instructed in and provided written copy of initial Home Exercise Program.                   Current: Patient reports not yet consistent with full HEP as she was traveling past week. Encouraging consistent compliance. 4/15/2021                      Patient will demonstrate right ankle DF AROM of 17 degrees to aid in completion of ADLs.                  Status at IE: right ankle DF 9 degrees.                    Current: Same as IE  [address] [place] [address] 280 be accomplished in 8 weeks:                   Patient will increase strength to 5/5 throughout B LEs to aid in return recreational activities and ADLs.                  Status at IE: right ankle DF, Inv, Ev 4/5.                   Current: Same as IE                      Patient will report pain less than 1-2/10 average to aid in completion of ADLs.                  Status at IE: 1-8/10                   Current: progressing with less pain                      Patient will be able to SLS eyes closed 30 seconds to demonstrate improved safety in dynamic functional activities.                     Status at IE: SLS eyes closed left 10, right 3 seconds                   Current: Same as IE                      Patient will improve FOTO (an established functional score where 100 = no disability) to 74 points overall to demonstrate improvement in functional ability.                    Status at IE:49                   Current: Same as IE    PLAN  [x]  Upgrade activities as tolerated     [x]  Continue plan of care  []  Update interventions per flow sheet       []  Discharge due to:_  []  Other:_      Frank [place], PT 4/23/2021  10:47 AM    Future Appointments   Date Time Provider Kiana Fraga   4/28/2021  9:30 AM MARGA Pineda [facility]   4/30/2021  9:30 AM MARGA Pineda [facility]   5/5/2021  9:30 AM MARGA Pineda [facility]   5/7/2021  9:30 AM [place] [facility]

## 2021-04-28 ENCOUNTER — HOSPITAL ENCOUNTER (OUTPATIENT)
Dept: PHYSICAL THERAPY | Age: 41
Discharge: HOME | End: 2021-04-28
Payer: OTHER GOVERNMENT

## 2021-04-28 PROCEDURE — 97530 THERAPEUTIC ACTIVITIES: CPT

## 2021-04-28 PROCEDURE — 97112 NEUROMUSCULAR REEDUCATION: CPT

## 2021-04-28 PROCEDURE — 97110 THERAPEUTIC EXERCISES: CPT

## 2021-04-28 NOTE — PROGRESS NOTES
PT DAILY TREATMENT NOTE    Patient Name: Lakeshia Xie  Date:2021  : 1980  [x]  Patient  Verified  Payor:  / Plan: Christopher Hunter 74 / Product Type:  /    In time: 789  Out time: 7663  Total Treatment Time (min): 76  Total Timed Codes (min): 70  1:1 Treatment Time ( [address] only): 60   Visit #: 4 of 15    Treatment Area: Right ankle pain [M25.571]    SUBJECTIVE  Pain Level (0-10 scale): 0-1  Any medication changes, allergies to medications, adverse drug reactions, diagnosis change, or new procedure performed?: [x] No    [] Yes (see summary sheet for update)  Subjective functional status/changes:   [] No changes reported  \"The ankle feels like it is getting stronger. I am working hard on the exercises. \"    OBJECTIVE    30 min Therapeutic Exercise:  [x] See flow sheet :   Rationale: increase ROM, increase strength and decrease pain to improve the patients ability to complete ADLs  ambulation safety and efficiency in order to improve patient's ability to safely ambulate at home for self care. 15 min Therapeutic Activity:  [x]  See flow sheet :   Rationale: increase ROM, increase strength and improve coordination  to improve the patients ability to Complete ADLS     15 min Neuromuscular Re-education:  [x]  See flow sheet :   Rationale: improve coordination, improve balance and increase proprioception  to improve the patients ability to complete ADLS, and decrease falls risk    With   [] TE   [] TA   [] neuro   [] other: Patient Education: [x] Review HEP    [] Progressed/Changed HEP based on:   [] positioning   [] body mechanics   [] transfers   [] heat/ice application    [] other:      Other Objective/Functional Measures: NA     Pain Level (0-10 scale) post treatment: 0    ASSESSMENT/Changes in Function: Patient working diligently in HEP and is noting discernible improvements in right ankle function. Patient responds well to treatment session.   No adverse effects were noted from today's treatment session. Patient will continue to benefit from skilled PT services to modify and progress therapeutic interventions, address functional mobility deficits, address ROM deficits, address strength deficits, analyze and address soft tissue restrictions, analyze and cue movement patterns, analyze and modify body mechanics/ergonomics, assess and modify postural abnormalities,  and instruct in home and community integration to attain remaining goals. []  See Plan of Care  []  See progress note/recertification  []  See Discharge Summary         Progress towards goals / Updated goals:  Short Term Goals: To be accomplished in 4 weeks:                   PLTZUKF will report compliance with HEP at least 1x/day to aid in rehabilitation program.                   Status at IE: Patient instructed in and provided written copy of initial Home Exercise Program.                   Current: Patient reports not yet consistent with full HEP as she was traveling past week. Encouraging consistent compliance.    4/15/2021                      Patient will demonstrate right ankle DF AROM of 17 degrees to aid in completion of ADLs.                  Status at IE: right ankle DF 9 degrees.                  Current: right ankle DF 13 degrees. 4/28/2021      Long Term Goals: To be accomplished in 8 weeks:                   Patient will increase strength to 5/5 throughout B LEs to aid in return recreational activities and ADLs.                  Status at IE: right ankle DF, Inv, Ev 4/5.                   Current: Same as IE                      Patient will report pain less than 1-2/10 average to aid in completion of ADLs.                  Status at IE: 1-8/10                   Current: progressing with less pain                      Patient will be able to SLS eyes closed 30 seconds to demonstrate improved safety in dynamic functional activities.                     Status at IE: SLS eyes closed left 10, right 3 seconds                   Current: Same as IE                      Patient will improve FOTO (an established functional score where 100 = no disability) to 74 points overall to demonstrate improvement in functional ability.                    Status at IE:49                   Current: Same as IE    PLAN  []  Upgrade activities as tolerated     [x]  Continue plan of care  []  Update interventions per flow sheet       []  Discharge due to:_  []  Other:_      Yvonne Gonzalez PT, DPT 4/28/2021  10:10 AM    Future Appointments   Date Time Provider Kiana Fraga   4/30/2021  9:30 AM Leesa Black PT [facility]   5/5/2021  9:30 AM Brooke Anderson [facility]   5/7/2021  9:30 AM [place] [facility]

## 2021-04-30 ENCOUNTER — APPOINTMENT (OUTPATIENT)
Dept: PHYSICAL THERAPY | Age: 41
End: 2021-04-30
Payer: OTHER GOVERNMENT

## 2021-05-05 ENCOUNTER — HOSPITAL ENCOUNTER (OUTPATIENT)
Dept: PHYSICAL THERAPY | Age: 41
Discharge: HOME | End: 2021-05-05
Payer: OTHER GOVERNMENT

## 2021-05-05 PROCEDURE — 97112 NEUROMUSCULAR REEDUCATION: CPT

## 2021-05-05 PROCEDURE — 97530 THERAPEUTIC ACTIVITIES: CPT

## 2021-05-05 PROCEDURE — 97110 THERAPEUTIC EXERCISES: CPT

## 2021-05-05 NOTE — PROGRESS NOTES
PT DAILY TREATMENT NOTE    Patient Name: Duke Middleton  Date:2021  : 1980  [x]  Patient  Verified  Payor:  / Plan: Christopher Hunter 76 / Product Type:  /    In time: 119  Out time: 5208  Total Treatment Time (min): 65  Total Timed Codes (min): 65  1:1 Treatment Time ( only): 58   Visit #: 5 of 15    Treatment Area: Right ankle pain [M25.571]    SUBJECTIVE  Pain Level (0-10 scale): 0-1  Any medication changes, allergies to medications, adverse drug reactions, diagnosis change, or new procedure performed?: [x] No    [] Yes (see summary sheet for update)  Subjective functional status/changes:   [] No changes reported  \"The therapy is definitely helping. My ankle feels stronger and I am tolerating workouts better. I am learning from you to listen to my body more and to stop workouts when my ankle starts to get fatigued and before it starts to buckle on me. \"    OBJECTIVE    30 min Therapeutic Exercise:  [x] See flow sheet :   Rationale: increase ROM, increase strength and decrease pain to improve the patients ability to complete ADLs  ambulation safety and efficiency in order to improve patient's ability to safely ambulate at home for self care.         15 min Therapeutic Activity:  [x]  See flow sheet :   Rationale: increase ROM, increase strength and improve coordination  to improve the patients ability to Complete ADLS     13 min Neuromuscular Re-education:  [x]  See flow sheet :   Rationale: improve coordination, improve balance and increase proprioception  to improve the patients ability to complete ADLS, and decrease falls risk    With   [] TE   [] TA   [] neuro   [] other: Patient Education: [x] Review HEP    [] Progressed/Changed HEP based on:   [] positioning   [] body mechanics   [] transfers   [] heat/ice application    [] other:      Other Objective/Functional Measures: NA     Pain Level (0-10 scale) post treatment: 20  ASSESSMENT/Changes in Function: Patient reports difficulty with dynamic activities such as hopping during her program with . Added more dynamic balance activities to program to improve functional balance. Patient responds well to treatment session. No adverse effects were noted from today's treatment session. Patient will continue to benefit from skilled PT services to modify and progress therapeutic interventions, address functional mobility deficits, address ROM deficits, address strength deficits, analyze and address soft tissue restrictions, analyze and cue movement patterns, analyze and modify body mechanics/ergonomics, assess and modify postural abnormalities,  and instruct in home and community integration to attain remaining goals. []  See Plan of Care  []  See progress note/recertification  []  See Discharge Summary         Progress towards goals / Updated goals:  Short Term Goals: To be accomplished in 4 weeks:                   CSDAAYV will report compliance with HEP at least 1x/day to aid in rehabilitation program.                   Status at IE: Patient instructed in and provided written copy of initial Home Exercise Program.                   Current: Patient reports not yet consistent with full HEP as she was traveling past week. Encouraging consistent compliance.    4/15/2021                      Patient will demonstrate right ankle DF AROM of 17 degrees to aid in completion of ADLs.                  Status at IE: right ankle DF 9 degrees.                  Current: right ankle DF 13 degrees. 4/28/2021      Long Term Goals: To be accomplished in 8 weeks:                   Patient will increase strength to 5/5 throughout B LEs to aid in return recreational activities and ADLs.                  Status at IE: right ankle DF, Inv, Ev 4/5.                   Current: Same as IE                      Patient will report pain less than 1-2/10 average to aid in completion of ADLs.                    Status at IE: 1-8/10                   Current: right ankle pain now more consistently in 0-2/10 range. 5/5/2021                      Patient will be able to SLS eyes closed 30 seconds to demonstrate improved safety in dynamic functional activities.                     Status at IE: SLS eyes closed left 10, right 3 seconds                   Current: Same as IE                      Patient will improve FOTO (an established functional score where 100 = no disability) to 74 points overall to demonstrate improvement in functional ability.                    Status at IE:49                   Current: Same as IE    PLAN  []  Upgrade activities as tolerated     [x]  Continue plan of care  []  Update interventions per flow sheet       []  Discharge due to:_  []  Other:_      Artis Lewis PT, DPT 5/5/2021  9:32 AM    Future Appointments   Date Time Provider Kiana Fraga   5/7/2021  9:30 AM Nora Barahona PT [facility]

## 2021-05-07 ENCOUNTER — HOSPITAL ENCOUNTER (OUTPATIENT)
Dept: PHYSICAL THERAPY | Age: 41
Discharge: HOME | End: 2021-05-07
Payer: OTHER GOVERNMENT

## 2021-05-07 PROCEDURE — 97530 THERAPEUTIC ACTIVITIES: CPT

## 2021-05-07 PROCEDURE — 97110 THERAPEUTIC EXERCISES: CPT

## 2021-05-07 PROCEDURE — 97112 NEUROMUSCULAR REEDUCATION: CPT

## 2021-05-07 NOTE — PROGRESS NOTES
PT DAILY TREATMENT NOTE    Patient Name: Shikha Moe  Date:2021  : 1980  [x]  Patient  Verified  Payor:  / Plan: Christopher Hunter 74 / Product Type:  /    In time: 930  Out time: 1048  Total Treatment Time (min): 78  Total Timed Codes (min): 70  1:1 Treatment Time ( [address] only): 60   Visit #: 6 of 15    Treatment Area: Right ankle pain [M25.571]    SUBJECTIVE  Pain Level (0-10 scale): 0-1  Any medication changes, allergies to medications, adverse drug reactions, diagnosis change, or new procedure performed?: [x] No    [] Yes (see summary sheet for update)  Subjective functional status/changes:   [] No changes reported  \"I didn't have any pain after last session. But, my ankle was very tired for a while afterwards. I can tell it is getting stronger, but still weaker than my left ankle. \"    OBJECTIVE    30 min Therapeutic Exercise:  [x] See flow sheet :   Rationale: increase ROM, increase strength and decrease pain to improve the patients ability to complete ADLs  ambulation safety and efficiency in order to improve patient's ability to safely ambulate at home for self care.         15 min Therapeutic Activity:  [x]  See flow sheet :   Rationale: increase ROM, increase strength and improve coordination  to improve the patients ability to Complete ADLS     15 min Neuromuscular Re-education:  [x]  See flow sheet :   Rationale: improve coordination, improve balance and increase proprioception  to improve the patients ability to complete ADLS, and decrease falls risk    With   [] TE   [] TA   [] neuro   [] other: Patient Education: [x] Review HEP    [] Progressed/Changed HEP based on:   [] positioning   [] body mechanics   [] transfers   [] heat/ice application    [] other:      Other Objective/Functional Measures: NA     Pain Level (0-10 scale) post treatment: 0    ASSESSMENT/Changes in Function: Patient instructed in additional proprioception training exercises for right ankle with good tolerance. Patient responds well to treatment session. No adverse effects were noted from today's treatment session. Patient will continue to benefit from skilled PT services to modify and progress therapeutic interventions, address functional mobility deficits, address ROM deficits, address strength deficits, analyze and address soft tissue restrictions, analyze and cue movement patterns, analyze and modify body mechanics/ergonomics, assess and modify postural abnormalities,  and instruct in home and community integration to attain remaining goals. []  See Plan of Care  []  See progress note/recertification  []  See Discharge Summary         Progress towards goals / Updated goals:  Short Term Goals: To be accomplished in 4 weeks:                   MMDTELV will report compliance with HEP at least 1x/day to aid in rehabilitation program.                   Status at IE: Patient instructed in and provided written copy of initial Home Exercise Program.                   Current: Patient reports not yet consistent with full HEP as she was traveling past week. Encouraging consistent compliance.    4/15/2021                      Patient will demonstrate right ankle DF AROM of 17 degrees to aid in completion of ADLs.                  Status at IE: right ankle DF 9 degrees.                  JHOSEME: WZZWF ankle DF 13 degrees.    4/28/2021      Long Term Goals: To be accomplished in 8 weeks:                   Patient will increase strength to 5/5 throughout B LEs to aid in return recreational activities and ADLs.                  Status at IE: right ankle DF, Inv, Ev 4/5.                   Current: Same as IE                      Patient will report pain less than 1-2/10 average to aid in completion of ADLs.                  Status at IE: 1-8/10                   Current: right ankle pain now more consistently in 0-2/10 range.     5/5/2021                      Patient will be able to SLS eyes closed 30 seconds to demonstrate improved safety in dynamic functional activities.                     Status at IE: SLS eyes closed left 10, right 3 seconds                   Current: Same as IE                      Patient will improve FOTO (an established functional score where 100 = no disability) to 74 points overall to demonstrate improvement in functional ability.                    Status at IE:49                   Current: Same as IE    PLAN  []  Upgrade activities as tolerated     [x]  Continue plan of care  []  Update interventions per flow sheet       []  Discharge due to:_  []  Other:_      Noé Lock, PT, DPT 5/7/2021  12:46 PM    Future Appointments   Date Time Provider Kiana Fraga   5/10/2021  8:45 AM MARGA MendozaHPTRIVERA [facility]   5/12/2021  2:30 PM Brooke Cuba [facility]   5/17/2021 10:15 AM No Haines, PT [facility]   5/19/2021  9:30 AM Naida Lott, PT [facility]   5/24/2021 10:15 AM No Haines, PT [facility]   5/26/2021  9:30 AM Florida Bookbinder, PT [facility]   6/2/2021 10:15 AM Florida Bookbinder, PT [facility]   6/7/2021 10:15 AM No Haines, PT [facility]   6/9/2021 10:15 AM Florida Bookbinder, PT [facility]

## 2021-05-10 ENCOUNTER — HOSPITAL ENCOUNTER (OUTPATIENT)
Dept: PHYSICAL THERAPY | Age: 41
Discharge: HOME | End: 2021-05-10
Payer: OTHER GOVERNMENT

## 2021-05-10 PROCEDURE — 97112 NEUROMUSCULAR REEDUCATION: CPT

## 2021-05-10 PROCEDURE — 97110 THERAPEUTIC EXERCISES: CPT

## 2021-05-10 PROCEDURE — 97530 THERAPEUTIC ACTIVITIES: CPT

## 2021-05-10 NOTE — PROGRESS NOTES
PT DAILY TREATMENT NOTE -    Patient Name: Rosa Isela Aranda  Date:5/10/2021  : 1980  [x]  Patient  Verified  Payor: SUSANNE / Plan: Kenny Alonzo / Product Type: Cordelia Corporal /    In YCMN:[id number]  Out time:1038  Total Treatment Time (min): 67  Total Timed Codes (min): 79   Visit #: 7 of 15    Treatment Area: Right ankle pain [M25.571]    SUBJECTIVE  Pain Level (0-10 scale): 0  Any medication changes, allergies to medications, adverse drug reactions, diagnosis change, or new procedure performed?: [x] No    [] Yes (see summary sheet for update)  Subjective functional status/changes:   [] No changes reported     Patient reports no new changes since last visit. OBJECTIVE    30 min Therapeutic Exercise:  [x] See flow sheet :   Rationale: increase ROM, increase strength and decrease pain to improve the patients ability to complete ADLs    15 min Therapeutic Activity:  [x]  See flow sheet :   Rationale: increase ROM, increase strength and improve coordination  to improve the patients ability to complete ADLs    22 min Neuromuscular Re-education:  [x]? See flow sheet :   Rationale: improve coordination, improve balance and increase proprioception  to improve the patients ability          With   [x] TE   [] TA   [] neuro   [] other: Patient Education: [x] Review HEP    [] Progressed/Changed HEP based on:   [] positioning   [] body mechanics   [] transfers   [] heat/ice application    [] other:      Other Objective/Functional Measures: NA     Pain Level (0-10 scale) post treatment: 0    ASSESSMENT/Changes in Function: Patient responds well to treatment session. Provided cues for activity pacing. She is challenged with balance activities with EC requiring UEA for safety.  Provided cues to reduce compensatory strategies No adverse effects were noted from today's treatment session    Patient will continue to benefit from skilled PT services to modify and progress therapeutic interventions, address functional mobility deficits, address ROM deficits, address strength deficits, analyze and address soft tissue restrictions, analyze and cue movement patterns, analyze and modify body mechanics/ergonomics, assess and modify postural abnormalities, address imbalance and instruct in home and community integration to attain remaining goals. []  See Plan of Care  []  See progress note/recertification  []  See Discharge Summary         Progress towards goals / Updated goals:  Short Term Goals: To be accomplished in 4 weeks:                   GQHHLSB will report compliance with HEP at least 1x/day to aid in rehabilitation program.                   Status at IE: Patient instructed in and provided written copy of initial Home Exercise Program.                   Current: Patient reports not yet consistent with full HEP as she was traveling past week. Encouraging consistent compliance.    4/15/2021                      Patient will demonstrate right ankle DF AROM of 17 degrees to aid in completion of ADLs.                  Status at IE: right ankle DF 9 degrees.                  SKYIPYU: DJRTL ankle DF 13 degrees.    4/28/2021      Long Term Goals: To be accomplished in 8 weeks:                   Patient will increase strength to 5/5 throughout B LEs to aid in return recreational activities and ADLs.                  Status at IE: right ankle DF, Inv, Ev 4/5.                   Current: Same as IE                      Patient will report pain less than 1-2/10 average to aid in completion of ADLs.                  Status at IE: 1-8/10                   Current: right ankle pain now more consistently in 0-2/10 range.    5/5/2021                      Patient will be able to SLS eyes closed 30 seconds to demonstrate improved safety in dynamic functional activities.                     Status at IE: SLS eyes closed left 10, right 3 seconds                   Current: Same as IE                      Patient will improve FOTO (an established functional score where 100 = no disability) to 74 points overall to demonstrate improvement in functional ability.                    Status at IE:49                   Current: Same as IE    PLAN  []  Upgrade activities as tolerated     [x]  Continue plan of care  []  Update interventions per flow sheet       []  Discharge due to:_  []  Other:_      Stuart Fragoso, PT, DPT 5/10/2021  9:44 AM    Future Appointments   Date Time Provider Kiana Fraga   5/12/2021  2:30 PM Brooke Traylor [facility]   5/17/2021 10:15 AM Amalia Turner PT [facility]   5/19/2021  9:30 AM Kriss Singh PT [facility]   5/24/2021 10:15 AM Amalia Turner PT [facility]   5/26/2021  9:30 AM Kriss Singh PT [facility]   6/2/2021 10:15 AM Kriss Singh PT [facility]   6/7/2021 10:15 AM Amalia Turner PT [facility]   6/9/2021 10:15 AM Kriss Singh PT [facility]

## 2021-05-12 ENCOUNTER — HOSPITAL ENCOUNTER (OUTPATIENT)
Dept: PHYSICAL THERAPY | Age: 41
Discharge: HOME | End: 2021-05-12
Payer: OTHER GOVERNMENT

## 2021-05-12 PROCEDURE — 97530 THERAPEUTIC ACTIVITIES: CPT

## 2021-05-12 PROCEDURE — 97112 NEUROMUSCULAR REEDUCATION: CPT

## 2021-05-12 PROCEDURE — 97110 THERAPEUTIC EXERCISES: CPT

## 2021-05-12 NOTE — PROGRESS NOTES
[facility] at [address] [place] [address] Drive: [phone]   Fax: [phone]  Progress Note  Patient Name: Manju Mejia : 1980   Medical   Diagnosis: Right ankle pain [M25.571] Treatment Diagnosis: Right ankle pain   Onset Date: 3/14/2021     Referral Source: Carmelita Butler RN Start of Care [facility] 2021   Prior Hospitalization: See medical history Provider #: [id number]   Prior Level of Function: Intense exerciser one hour or more five to six days per week. Comorbidities: right ankle sprain previously x3. OA knees and hips and shoulders, protein S deficiency   Medications: Verified on Patient Summary List      ===========================================================================================  Assessment / Summary of Care:  Manju Mejia is a 47 y.o.  female who is receiving PT instruction in proprioceptive retraining s/p right ankle sprain. Patient is an aggressive exerciser with tendency to push exercises to maximum safe limits. Right ankle strength and proprioception are steadily improving and patient compliance with exercising within safe limits is also improving.    ===========================================================================================    Plan:Continue therapy per initial plan/protocol at a frequency of  2 x per week for 5 weeks    Progress Towards Goals:   Short Term Goals: To be accomplished in 4 weeks:                   Patient will report compliance with HEP at least 1x/day to aid in rehabilitation program.                   Status at IE: Patient instructed in and provided written copy of initial Home Exercise Program.                   Current: Patient reports not yet consistent with full HEP as she was traveling past week. Encouraging consistent compliance.    4/15/2021                      Patient will demonstrate right ankle DF AROM of 17 degrees to aid in completion of ADLs.                    Status at IE: right ankle DF 9 degrees.                  UIVJRQD: YFGQS ankle DF 13 degrees.    4/28/2021      Long Term Goals: To be accomplished in 8 weeks:                   Patient will increase strength to 5/5 throughout B LEs to aid in return recreational activities and ADLs.                  Status at IE: right ankle DF, Inv, Ev 4/5.                   Current: Same as IE                      Patient will report pain less than 1-2/10 average to aid in completion of ADLs.                  Status at IE: 1-8/10                   Current: right ankle pain now more consistently in 0-2/10 range. 5/5/2021                      Patient will be able to SLS eyes closed 30 seconds to demonstrate improved safety in dynamic functional activities.                     Status at IE: SLS eyes closed left 10, right 3 seconds                   Current: Same as IE                      Patient will improve FOTO (an established functional score where 100 = no disability) to 74 points overall to demonstrate improvement in functional ability.                  Status at IE:49                   Current: Same as IE    ===========================================================================================  Subjective: \"The therapy is definitely helping. My ankle feels stronger and I am tolerating workouts better. I am learning from you to listen to my body more and to stop workouts when my ankle starts to get fatigued and before it starts to buckle on me. \"        Therapist Signature: Artis Lewis PT, DPT Date: 0/5/1666   Re-Certification: NA Time: 3:46 PM         [facility] at [address]                    Phone: [phone]   Fax: [phone]  .

## 2021-05-12 NOTE — PROGRESS NOTES
PT DAILY TREATMENT NOTE    Patient Name: Lauren Martinez  Date:2021  : 1980  [x]  Patient  Verified  Payor:  / Plan: Christopher Hunter 76 / Product Type:  /    In time: 227  Out time: 340  Total Treatment Time (min): 73  Total Timed Codes (min): 73  1:1 Treatment Time ([facility] only): 66   Visit #: 8 of 15    Treatment Area: Right ankle pain [M25.571]    SUBJECTIVE  Pain Level (0-10 scale): 0 at rest  Any medication changes, allergies to medications, adverse drug reactions, diagnosis change, or new procedure performed?: [x] No    [] Yes (see summary sheet for update)  Subjective functional status/changes:   [] No changes reported  \"The right ankle is doing well. But, I rolled my left ankle doing a workout on my own yesterday. The left ankle is a little sore when I move it certain ways. \"    OBJECTIVE    33 min Therapeutic Exercise:  [x] See flow sheet :   Rationale: increase ROM, increase strength and decrease pain to improve the patients ability to complete ADLs  ambulation safety and efficiency in order to improve patient's ability to safely ambulate at home for self care. 15 min Therapeutic Activity:  [x]  See flow sheet :   Rationale: increase ROM, increase strength and improve coordination  to improve the patients ability to Complete ADLS     15 min Neuromuscular Re-education:  [x]  See flow sheet :   Rationale: improve coordination, improve balance and increase proprioception  to improve the patients ability to complete ADLS, and decrease falls risk    With   [] TE   [] TA   [] neuro   [] other: Patient Education: [x] Review HEP    [] Progressed/Changed HEP based on:   [] positioning   [] body mechanics   [] transfers   [] heat/ice application    [] other:      Other Objective/Functional Measures: NA     Pain Level (0-10 scale) post treatment: 0    ASSESSMENT/Changes in Function: Patient balance reactions steadily improving.  Patient noting increased ease with maintaining balance on unstable surfaces. Patient responds well to treatment session. No adverse effects were noted from today's treatment session. Patient will continue to benefit from skilled PT services to modify and progress therapeutic interventions, address functional mobility deficits, address ROM deficits, address strength deficits, analyze and address soft tissue restrictions, analyze and cue movement patterns, analyze and modify body mechanics/ergonomics, assess and modify postural abnormalities,  and instruct in home and community integration to attain remaining goals. []  See Plan of Care  []  See progress note/recertification  []  See Discharge Summary         Progress towards goals / Updated goals:  Short Term Goals: To be accomplished in 4 weeks:                   LPXDUPF will report compliance with HEP at least 1x/day to aid in rehabilitation program.                   Status at IE: Patient instructed in and provided written copy of initial Home Exercise Program.                   Current: Patient reports not yet consistent with full HEP as she was traveling past week. Encouraging consistent compliance.    4/15/2021                      Patient will demonstrate right ankle DF AROM of 17 degrees to aid in completion of ADLs.                  Status at IE: right ankle DF 9 degrees.                  NIKYVCD: FDGMO ankle DF 13 degrees.    4/28/2021      Long Term Goals: To be accomplished in 8 weeks:                   Patient will increase strength to 5/5 throughout B LEs to aid in return recreational activities and ADLs.                  Status at IE: right ankle DF, Inv, Ev 4/5.                   Current: right ankle DF and Inv improved to 4+/5, Ev progressing but not yet at 4+/5.    5/12/2021                      Patient will report pain less than 1-2/10 average to aid in completion of ADLs.                    Status at IE: 1-8/10                   Current: right ankle pain now more consistently in 0-2/10 range.    5/5/2021                      Patient will be able to SLS eyes closed 30 seconds to demonstrate improved safety in dynamic functional activities.                     Status at IE: SLS eyes closed left 10, right 3 seconds                   Current: SLS eyes closed left 2, right 8 seconds     5/12/2021                      Patient will improve FOTO (an established functional score where 100 = no disability) to 74 points overall to demonstrate improvement in functional ability.                    Status at IE:49                   Current: Same as IE    PLAN  []  Upgrade activities as tolerated     [x]  Continue plan of care  []  Update interventions per flow sheet       []  Discharge due to:_  []  Other:_      Arlen Ar, PT, DPT 5/12/2021  2:35 PM    Future Appointments   Date Time Provider Kiana Fraga   5/17/2021 10:15 AM Miller Sheridan, PT [facility]   5/19/2021  9:30 AM Hudson Serrato, PT [facility]   5/24/2021 10:15 AM Miller Sheridan, PT [facility]   5/26/2021  9:30 AM Hudson Serrato, PT [facility]   6/2/2021 10:15 AM Hudson Serrato, PT [facility]   6/7/2021 10:15 AM Miller Sheridan, PT [facility]   6/9/2021 10:15 AM Hudson Serrato, PT [facility]

## 2021-05-17 ENCOUNTER — HOSPITAL ENCOUNTER (OUTPATIENT)
Dept: PHYSICAL THERAPY | Age: 41
Discharge: HOME | End: 2021-05-17
Payer: OTHER GOVERNMENT

## 2021-05-17 PROCEDURE — 97530 THERAPEUTIC ACTIVITIES: CPT | Performed by: PHYSICAL THERAPIST

## 2021-05-17 PROCEDURE — 97112 NEUROMUSCULAR REEDUCATION: CPT | Performed by: PHYSICAL THERAPIST

## 2021-05-17 PROCEDURE — 97110 THERAPEUTIC EXERCISES: CPT | Performed by: PHYSICAL THERAPIST

## 2021-05-17 NOTE — PROGRESS NOTES
PT DAILY TREATMENT NOTE    Patient Name: Ashish Warner  XZGY:  : 1980  [x]  Patient  Verified  Payor:  / Plan: Christopher Hunter 76 / Product Type:  /    In time:1018  Out time:1114  Total Treatment Time (min): 56  Total Timed Codes (min): 56   Visit #: 9 of 15    Treatment Area: Right ankle pain [M25.571]    SUBJECTIVE  Pain Level (0-10 scale): 0  Any medication changes, allergies to medications, adverse drug reactions, diagnosis change, or new procedure performed?: [x] No    [] Yes (see summary sheet for update)  Subjective functional status/changes:   [] No changes reported  Pt reports [place] this weekend and tolerated well , no instability or pain general fatigue end of day   Left ankle is recovered from irritation last week     OBJECTIVE  15 min Therapeutic Exercise:  [x]? See flow sheet :   Rationale: increase ROM, increase strength and decrease pain to improve the patients ability to complete ADLs  ambulation safety and efficiency in order to improve patient's ability to safely ambulate at home for self care. 16 min Therapeutic Activity:  [x]? See flow sheet :   Rationale: increase ROM, increase strength and improve coordination  to improve the patients ability to Complete ADLS     25 min Neuromuscular Re-education:  [x]?   See flow sheet :   Rationale: improve coordination, improve balance and increase proprioception  to improve the patients ability to complete ADLS, and decrease falls risk          With   [] TE   [] TA   [] neuro   [] other: Patient Education: [x] Review HEP    [] Progressed/Changed HEP based on:   [] positioning   [] body mechanics   [] transfers   [] heat/ice application    [] other:      Other Objective/Functional Measures:   SLS on solid ground Eyes open 60 sec + each leg   SLS on solid ground eyes closed 21 sec left , 17 sec right   SLS eyes closed on airex 12 sec left , 7 sec on right Pain Level (0-10 scale) post treatment: 0    ASSESSMENT/Changes in Function: Patient balance reactions continue to steadily improve as noted with tolerance to [place] beach this weekend . Patient noting increased ease with maintaining balance on unstable surfaces. Patient responds well to treatment session. No adverse effects were noted from today's treatment session. Patient will continue to benefit from skilled PT services to modify and progress therapeutic interventions, address functional mobility deficits, address ROM deficits, address strength deficits, analyze and address soft tissue restrictions, analyze and cue movement patterns, analyze and modify body mechanics/ergonomics, assess and modify postural abnormalities and address imbalance/dizziness to attain remaining goals. [x]  See Plan of Care  []  See progress note/recertification  []  See Discharge Summary         Progress towards goals / Updated goals:  Short Term Goals: To be accomplished in 4 weeks:                   IASURTR will report compliance with HEP at least 1x/day to aid in rehabilitation program.                   Status at IE: Patient instructed in and provided written copy of initial Home Exercise Program.                   Current: Patient reports doing ex consistently , MET 5/17/2021                        Patient will demonstrate right ankle DF AROM of 17 degrees to aid in completion of ADLs.                  Status at IE: right ankle DF 9 degrees.                  HAGEKC: SWUVL ankle DF 13 degrees.    4/28/2021      Long Term Goals: To be accomplished in 8 weeks:                   Patient will increase strength to 5/5 throughout B LEs to aid in return recreational activities and ADLs.                    Status at IE: right ankle DF, Inv, Ev 4/5.                   Current: right ankle DF and Inv improved to 4+/5, Ev progressing but not yet at 4+/5.    5/12/2021                      Patient will report pain less than 1-2/10 average to aid in completion of ADLs.                  Status at IE: 1-8/10                   Current: right ankle pain now more consistently in 0-2/10 range.    5/5/2021                      Patient will be able to SLS eyes closed 30 seconds to demonstrate improved safety in dynamic functional activities.                     Status at IE: SLS eyes closed left 10, right 3 seconds                   Current: SLS eyes closed left 21, right 17 seconds on solid ground, on foam airex mat 12 sec left , 7 sec on right   progressing  5/17/2021                      Patient will improve FOTO (an established functional score where 100 = no disability) to 74 points overall to demonstrate improvement in functional ability.                    Status at IE:49                   Current: 84/100 MET 5/17/2021       PLAN  [x]  Upgrade activities as tolerated     [x]  Continue plan of care  []  Update interventions per flow sheet       []  Discharge due to:_  []  Other:_      Tasneem Leigh, MARGA 5/17/2021  10:22 AM    Future Appointments   Date Time Provider Kiana Fraga   5/19/2021  9:30 AM MARGA Mendoza [facility]   5/24/2021 10:15 AM MAGRA Terrell [facility]   5/26/2021  9:30 AM MARGA Escobar [facility]   6/2/2021 10:15 AM MARGA Escobar [facility]   6/7/2021 10:15 AM MARGA Terrell [facility]   6/9/2021  9:30 AM MARGA Mendoza [facility]

## 2021-05-19 ENCOUNTER — HOSPITAL ENCOUNTER (OUTPATIENT)
Dept: PHYSICAL THERAPY | Age: 41
Discharge: HOME | End: 2021-05-19
Payer: OTHER GOVERNMENT

## 2021-05-19 PROCEDURE — 97112 NEUROMUSCULAR REEDUCATION: CPT

## 2021-05-19 PROCEDURE — 97110 THERAPEUTIC EXERCISES: CPT

## 2021-05-19 PROCEDURE — 97530 THERAPEUTIC ACTIVITIES: CPT

## 2021-05-19 NOTE — PROGRESS NOTES
PT DAILY TREATMENT NOTE    Patient Name: Vandana Anaya  Date:2021  : 1980  [x]  Patient  Verified  Payor:  / Plan: Cleve Gama / Product Type:  /    In time: 208  Out time: 1055  Total Treatment Time (min): 83  Total Timed Codes (min): 65  Visit #: 10 of 15    Treatment Area: Right ankle pain [M25.571]    SUBJECTIVE  Pain Level (0-10 scale): 0  Any medication changes, allergies to medications, adverse drug reactions, diagnosis change, or new procedure performed?: [x] No    [] Yes (see summary sheet for update)  Subjective functional status/changes:   [] No changes reported  \"The ankles are doing well. I increased my workout at home and the calves are a bit sore and have been cramping. \"    OBJECTIVE    35 min Therapeutic Exercise:  [x] See flow sheet :   Rationale: increase ROM, increase strength and decrease pain to improve the patients ability to complete ADLs  ambulation safety and efficiency in order to improve patient's ability to safely ambulate at home for self care. 15 min Therapeutic Activity:  [x]  See flow sheet :   Rationale: increase ROM, increase strength and improve coordination  to improve the patients ability to Complete ADLS     15 min Neuromuscular Re-education:  [x]  See flow sheet :   Rationale: improve coordination, improve balance and increase proprioception  to improve the patients ability to complete ADLS, and decrease falls risk    With   [] TE   [] TA   [] neuro   [] other: Patient Education: [x] Review HEP    [] Progressed/Changed HEP based on:   [] positioning   [] body mechanics   [] transfers   [] heat/ice application    [] other:      Other Objective/Functional Measures: NA     Pain Level (0-10 scale) post treatment: 0    ASSESSMENT/Changes in Function: Patient steadily improving in exercise tolerance and is adding more physically challenging tasks to daily exercise regimen without recurrence of giving out of either ankle.  Patient responds well to treatment session. No adverse effects were noted from today's treatment session. Patient will continue to benefit from skilled PT services to modify and progress therapeutic interventions, address functional mobility deficits, address ROM deficits, address strength deficits, analyze and address soft tissue restrictions, analyze and cue movement patterns, analyze and modify body mechanics/ergonomics, assess and modify postural abnormalities,  and instruct in home and community integration to attain remaining goals. []  See Plan of Care  []  See progress note/recertification  []  See Discharge Summary         Progress towards goals / Updated goals:  Short Term Goals: To be accomplished in 4 weeks:                   YZMSRDF will report compliance with HEP at least 1x/day to aid in rehabilitation program.                   Status at IE: Patient instructed in and provided written copy of initial Home Exercise Program.                   Current: Patient reports doing ex consistently , MET 5/17/2021                        Patient will demonstrate right ankle DF AROM of 17 degrees to aid in completion of ADLs.                  Status at IE: right ankle DF 9 degrees.                  VJZEIQO: TLNVF ankle DF 13 degrees.    4/28/2021      Long Term Goals: To be accomplished in 8 weeks:                   Patient will increase strength to 5/5 throughout B LEs to aid in return recreational activities and ADLs.                  Status at IE: right ankle DF, Inv, Ev 4/5.                   Current: right ankle DF and Inv improved to 4+/5, Ev progressing but not yet at 4+/5.    5/12/2021                      Patient will report pain less than 1-2/10 average to aid in completion of ADLs.                    Status at IE: 1-8/10                   Current: right ankle pain now more consistently in 0-2/10 range.    5/5/2021                      Patient will be able to SLS eyes closed 30 seconds to demonstrate improved safety in dynamic functional activities.                     Status at IE: SLS eyes closed left 10, right 3 seconds                   Current: SLS eyes closed left 21, right 17 seconds on solid ground, on foam airex mat 12 sec left , 7 sec on right   progressing  5/17/2021                      Patient will improve FOTO (an established functional score where 100 = no disability) to 74 points overall to demonstrate improvement in functional ability.                    Status at IE:49                   Current: 84/100 MET 5/17/2021    PLAN  []  Upgrade activities as tolerated     [x]  Continue plan of care  []  Update interventions per flow sheet       []  Discharge due to:_  []  Other:_      Joellen Singh, PT, DPT 5/19/2021  9:47 AM    Future Appointments   Date Time Provider Kiana Fraga   5/24/2021 10:15 AM MARGA Murry [facility]   5/26/2021  9:30 AM MARGA Traylor [facility]   6/2/2021 10:15 AM MARGA Traylor [facility]   6/7/2021 10:15 AM MARGA Murry [facility]   6/9/2021  9:30 AM MARGA Mata [facility]

## 2021-05-24 ENCOUNTER — HOSPITAL ENCOUNTER (OUTPATIENT)
Dept: PHYSICAL THERAPY | Age: 41
Discharge: HOME | End: 2021-05-24
Payer: OTHER GOVERNMENT

## 2021-05-24 PROCEDURE — 97110 THERAPEUTIC EXERCISES: CPT

## 2021-05-24 PROCEDURE — 97112 NEUROMUSCULAR REEDUCATION: CPT

## 2021-05-24 PROCEDURE — 97530 THERAPEUTIC ACTIVITIES: CPT

## 2021-05-24 NOTE — PROGRESS NOTES
PT DAILY TREATMENT NOTE     Patient Name: Rosa Isela Aranda  Date:2021  : 1980  [x]  Patient  Verified  Payor:  / Plan: Christopher Hunter 74 / Product Type:  /    In time:1025  Out time:1135  Total Treatment Time (min): 70  Total Timed Codes (min): 70   Visit #: 11 of 15    Treatment Area: Right ankle pain [M25.571]    SUBJECTIVE  Pain Level (0-10 scale): 0  Any medication changes, allergies to medications, adverse drug reactions, diagnosis change, or new procedure performed?: [x] No    [] Yes (see summary sheet for update)  Subjective functional status/changes:   [] No changes reported    Patient reports that she has less pain with driving. She also reports that she was able to run on uneven terrain with minimal discomfort. OBJECTIVE    35 min Therapeutic Exercise:  [x] See flow sheet :   Rationale: increase ROM, increase strength and decrease pain to improve the patients ability to complete ADLs    15 min Therapeutic Activity:  [x]  See flow sheet :   Rationale: increase ROM, increase strength and improve coordination  to improve the patients ability to complete ADLs    20 min Neuromuscular Re-education:  [x]? See flow sheet :   Rationale: improve coordination, improve balance and increase proprioception  to improve the patients ability to complete ADLS, and decrease falls risk          With   [x] TE   [] TA   [] neuro   [] other: Patient Education: [x] Review HEP    [] Progressed/Changed HEP based on:   [] positioning   [] body mechanics   [] transfers   [] heat/ice application    [] other:      Other Objective/Functional Measures: NA     Pain Level (0-10 scale) post treatment: 1    ASSESSMENT/Changes in Function: Patient responds well to treatment session. Patient required minimal UEA with SLS with EC. She was challenged with exercise prescribed. No adverse effects were noted from today's treatment session.     Patient will continue to benefit from skilled PT services to modify and progress therapeutic interventions, address functional mobility deficits, address ROM deficits, address strength deficits, analyze and address soft tissue restrictions, analyze and cue movement patterns, analyze and modify body mechanics/ergonomics, assess and modify postural abnormalities, address imbalance/dizziness and instruct in home and community integration to attain remaining goals. []  See Plan of Care  []  See progress note/recertification  []  See Discharge Summary         Progress towards goals / Updated goals:  Short Term Goals: To be accomplished in 4 weeks:                   PBDPHRK will report compliance with HEP at least 1x/day to aid in rehabilitation program.                   Status at IE: Patient instructed in and provided written copy of initial Home Exercise Program.                   Current: Patient reports doing ex consistently , Met 5/17/2021                        Patient will demonstrate right ankle DF AROM of 17 degrees to aid in completion of ADLs.                  Status at IE: right ankle DF 9 degrees.                  AMECMLX: VGLSB ankle DF 13 degrees.    4/28/2021      Long Term Goals: To be accomplished in 8 weeks:                   Patient will increase strength to 5/5 throughout B LEs to aid in return recreational activities and ADLs.                  Status at IE: right ankle DF, Inv, Ev 4/5.                   Current: right ankle DF and Inv improved to 4+/5, Ev progressing but not yet at 4+/5.    5/12/2021                      Patient will report pain less than 1-2/10 average to aid in completion of ADLs.                  Status at IE: 1-8/10                   Current: right ankle pain now more consistently in 0-2/10 range.    5/5/2021                      Patient will be able to SLS eyes closed 30 seconds to demonstrate improved safety in dynamic functional activities.                     Status at IE: SLS eyes closed left 10, right 3 seconds                   Current: SLS eyes closed left 21, right 17 seconds on solid ground, on foam airex mat 12 sec left , 7 sec on right   progressing  5/17/2021                      Patient will improve FOTO (an established functional score where 100 = no disability) to 74 points overall to demonstrate improvement in functional ability.                    Status at IE:49                   Current: 84/100 Met 5/17/2021    PLAN  []  Upgrade activities as tolerated     [x]  Continue plan of care  []  Update interventions per flow sheet       []  Discharge due to:_  []  Other:_      Jeffery Meza, PT, DPT 5/24/2021  10:06 AM    Future Appointments   Date Time Provider Kiana Fraga   5/24/2021 10:15 AM Nhi Alvarez, MARGA PR[place] [facility]   5/26/2021  9:30 AM Garfield Sim, PT [facility]   6/2/2021 10:15 AM MARGA Greenfield [facility]   6/7/2021 10:15 AM Anya Norris, PT [facility]   6/9/2021  9:30 AM Nhi Alvarez PT [facility]

## 2021-05-26 ENCOUNTER — APPOINTMENT (OUTPATIENT)
Dept: PHYSICAL THERAPY | Age: 41
End: 2021-05-26
Payer: OTHER GOVERNMENT

## 2021-06-01 NOTE — PROGRESS NOTES
[facility] at [address] [place] [address] Drive: [phone]   Fax: [phone]  Discharge Summary  Patient Name: Nelly Good : 1980   Medical   Diagnosis: Right ankle pain [M25.571] Treatment Diagnosis: Right ankle pain   Onset Date: 3/14/2021     Referral Source: Ashlyn Montes MD [facility]): 2021   Prior Hospitalization: See medical history Provider #: [id number]   Prior Level of Function: Intense exerciser one hour or more five to six days per week. Comorbidities: right ankle sprain previously x3. OA knees and hips and shoulders, protein S deficiency   Medications: Verified on Patient Summary List      ===========================================================================================  Assessment / Summary of Care:  Nelly Good is a 47 y.o.   female who has been receiving PT treatment s/p right ankle sprain with focus on improving bilateral ankle strength and proprioception to decrease risk of  Continued ankle sprains. Patient has made good progress towards goals, increasing ROM, strength and balance reactions. 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 Below is patient progress towards goals at last attended session.    ===========================================================================================    Plan: Discharge to independent [facility]. Progress Towards Goals:     Short Term Goals: To be accomplished in 4 weeks:                   [id number] will report compliance with HEP at least 1x/day to aid in rehabilitation program.                   Status at IE: Patient instructed in and provided written copy of initial Home Exercise Program.                   Current: Patient reports doing ex consistently , Met 2021                        Patient will demonstrate right ankle DF AROM of 17 degrees to aid in completion of ADLs.                    Status at IE: right ankle DF 9 degrees.                  [id number]: COGNU ankle DF 13 degrees.    4/28/2021      Long Term Goals: To be accomplished in 8 weeks:                   Patient will increase strength to 5/5 throughout B LEs to aid in return recreational activities and ADLs.                  Status at IE: right ankle DF, Inv, Ev 4/5.                   Current: right ankle DF and Inv improved to 4+/5, Ev progressing but not yet at 4+/5.    5/12/2021                      Patient will report pain less than 1-2/10 average to aid in completion of ADLs.                  Status at IE: 1-8/10                   Current: right ankle pain now more consistently in 0-2/10 range.    5/5/2021                      Patient will be able to SLS eyes closed 30 seconds to demonstrate improved safety in dynamic functional activities.                     Status at IE: SLS eyes closed left 10, right 3 seconds                   Current: SLS eyes closed left 21, right 17 seconds on solid ground, on foam airex mat 12 sec left , 7 sec on right   progressing  5/17/2021                      Patient will improve FOTO (an established functional score where 100 = no disability) to 74 points overall to demonstrate improvement in functional ability.                    Status at IE:49                   Current: 84/100 Met 5/17/2021    ===========================================================================================    Therapist Signature: Zuleyma Stuart PT, DPT Date: 6/1/2021     Time: 9:00 AM

## 2021-06-02 ENCOUNTER — APPOINTMENT (OUTPATIENT)
Dept: PHYSICAL THERAPY | Age: 41
End: 2021-06-02

## 2021-06-07 ENCOUNTER — APPOINTMENT (OUTPATIENT)
Dept: PHYSICAL THERAPY | Age: 41
End: 2021-06-07

## 2021-06-09 ENCOUNTER — APPOINTMENT (OUTPATIENT)
Dept: PHYSICAL THERAPY | Age: 41
End: 2021-06-09

## 2021-07-15 NOTE — ED NOTES
I have reviewed discharge instructions with the patient. The patient verbalized understanding. Patient armband removed and shredded Patient notified about the result.